# Patient Record
Sex: MALE | Race: WHITE
[De-identification: names, ages, dates, MRNs, and addresses within clinical notes are randomized per-mention and may not be internally consistent; named-entity substitution may affect disease eponyms.]

---

## 2019-11-12 ENCOUNTER — HOSPITAL ENCOUNTER (OUTPATIENT)
Dept: HOSPITAL 47 - RADMRIMAIN | Age: 44
Discharge: HOME | End: 2019-11-12
Attending: INTERNAL MEDICINE
Payer: COMMERCIAL

## 2019-11-12 DIAGNOSIS — G43.909: Primary | ICD-10-CM

## 2019-11-12 DIAGNOSIS — I63.9: ICD-10-CM

## 2019-11-12 DIAGNOSIS — R90.89: ICD-10-CM

## 2019-11-12 PROCEDURE — 70553 MRI BRAIN STEM W/O & W/DYE: CPT

## 2019-11-12 NOTE — MR
EXAMINATION TYPE: MR brain and iac wo/w con

 

DATE OF EXAM: 11/12/2019

 

COMPARISON: NONE

 

HISTORY: Temporal headaches

 

TECHNIQUE: 

Multiplanar, multisequence images of the brain and brainstem is performed without and with IV contras
t, utilizing 13 mL intravenous Gadavist .

 

FINDINGS: Diffusion weighted images demonstrate no evidence of a recent infarct or other diffusion ab
normality. There is an old lacunar injury of the caudate head.  There is no extra-axial fluid collect
ion. There are few scattered T2/FLAIR hyperintense foci in the subcortical and periventricular white 
matter, predominantly in the frontal lobes. These measure up to 4 mm. The ventricular system and cist
ernal spaces are normal in size and appearance.  The brain volume is age appropriate.

 

Midline structures demonstrate normal morphology.  The craniocervical junction appears within normal 
limits.  Post contrast images demonstrate no abnormal enhancement. The dural venous sinuses appear pa
tent. Scant mucosal thickening is seen in the ethmoid sinuses. The remaining visualized sinuses are c
lear and the globes are intact. There is tortuosity of the cavernous portions of the internal carotid
 atherosclerosis is also seen within the cavernous segments.

 

No abnormal enhancement of 7th or 8th cranial nerves. No evidence of acoustic schwannoma. No cerebell
ar pontine angle mass.

 

IMPRESSION: 

1. Mild burden nonspecific white matter change for age given the frontal lobe predominant distributio
n sequela of chronic migraines is a primary consideration. Chronic microangiopathy is another possibi
lity. Other etiologies are possible but less likely. Old lacunar injury is also seen of the caudate n
ucleus on the right.

2. No acute infarct, midline shift or mass effect. No abnormal intracranial enhancement. No MR eviden
ce of acoustic schwannoma or abnormal seventh/8th cranial nerve enhancement.

3. Minimal mucosal thickening in the ethmoid sinuses.

## 2020-03-20 ENCOUNTER — HOSPITAL ENCOUNTER (OUTPATIENT)
Dept: HOSPITAL 47 - RADCTMAIN | Age: 45
Discharge: HOME | End: 2020-03-20
Attending: INTERNAL MEDICINE
Payer: COMMERCIAL

## 2020-03-20 DIAGNOSIS — N28.89: Primary | ICD-10-CM

## 2020-03-20 PROCEDURE — 74170 CT ABD WO CNTRST FLWD CNTRST: CPT

## 2020-03-20 NOTE — CT
EXAMINATION TYPE: CT abdomen wo/w con

 

DATE OF EXAM: 3/20/2020

 

COMPARISON: None.

 

HISTORY: Pt states renal mass Rt side found during MRI. Not c/o any sx.

 

CT DLP: 3228.9 mGycm, Automated Exposure Control for Dose Reduction was Utilized.

 

CONTRAST: 

CT scan of the abdomen is performed with oral and with IV Contrast, patient injected with 100 mL of I
sovue 300.

 

FINDINGS:

 

LUNG BASES:  No significant abnormality is appreciated.

 

LIVER/GB: Noncontrast images show liver hypodense consistent with diffuse fatty infiltration.

 

PANCREAS:  No significant abnormality is seen.

 

SPLEEN:  No significant abnormality is seen.

 

ADRENALS:  No significant abnormality is seen.

 

KIDNEYS: Noncontrast images show no renal calculi bilaterally. There is abnormal anterior axis or til
orly positioning to the right kidney. Postcontrast images show symmetric cortical medullary uptake and
 excretion without hydronephrosis seen bilaterally. No concerning solid or cystic renal masses presen
t in either kidney. Suspect abnormal medial positioning the lower pole of the right kidney and axis m
ay have mimicked lesion on outside lumbar spine MRI

 

BOWEL: Oral contrast only reaches jejunal loops in the left abdomen. No suspicious small or large bow
el dilatation.

 

LYMPH NODES:  No greater than 1cm abdominal lymph nodes are appreciated.

 

OSSEOUS STRUCTURES:  No significant abnormality is seen.

 

OTHER:  No significant additional abnormality is seen.

 

IMPRESSION:  No suspicious renal solid or cystic masses. Abnormal positioning and axis of right kidne
y is suspected to have minimal mass on outside lumbar spine MRI, correlate clinically.

## 2023-06-25 ENCOUNTER — HOSPITAL ENCOUNTER (EMERGENCY)
Dept: HOSPITAL 47 - EC | Age: 48
LOS: 1 days | Discharge: HOME | End: 2023-06-26
Payer: COMMERCIAL

## 2023-06-25 VITALS — RESPIRATION RATE: 18 BRPM

## 2023-06-25 DIAGNOSIS — S81.801A: Primary | ICD-10-CM

## 2023-06-25 DIAGNOSIS — J45.909: ICD-10-CM

## 2023-06-25 DIAGNOSIS — Z86.59: ICD-10-CM
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
## 2023-06-25 LAB
ALBUMIN SERPL-MCNC: 4.8 G/DL (ref 3.5–5)
ALP SERPL-CCNC: 116 U/L (ref 38–126)
ALT SERPL-CCNC: 36 U/L (ref 4–49)
ANION GAP SERPL CALC-SCNC: 10 MMOL/L
AST SERPL-CCNC: 32 U/L (ref 17–59)
BASOPHILS # BLD AUTO: 0 K/UL (ref 0–0.2)
BASOPHILS NFR BLD AUTO: 1 %
BUN SERPL-SCNC: 20 MG/DL (ref 9–20)
CALCIUM SPEC-MCNC: 9.7 MG/DL (ref 8.4–10.2)
CHLORIDE SERPL-SCNC: 103 MMOL/L (ref 98–107)
CO2 SERPL-SCNC: 24 MMOL/L (ref 22–30)
EOSINOPHIL # BLD AUTO: 0.1 K/UL (ref 0–0.7)
EOSINOPHIL NFR BLD AUTO: 2 %
ERYTHROCYTE [DISTWIDTH] IN BLOOD BY AUTOMATED COUNT: 5.18 M/UL (ref 4.3–5.9)
ERYTHROCYTE [DISTWIDTH] IN BLOOD: 13.6 % (ref 11.5–15.5)
GLUCOSE SERPL-MCNC: 175 MG/DL (ref 74–99)
GLUCOSE UR QL: (no result)
HCT VFR BLD AUTO: 45.6 % (ref 39–53)
HGB BLD-MCNC: 15.3 GM/DL (ref 13–17.5)
LYMPHOCYTES # SPEC AUTO: 2.3 K/UL (ref 1–4.8)
LYMPHOCYTES NFR SPEC AUTO: 42 %
MCH RBC QN AUTO: 29.6 PG (ref 25–35)
MCHC RBC AUTO-ENTMCNC: 33.6 G/DL (ref 31–37)
MCV RBC AUTO: 88 FL (ref 80–100)
MONOCYTES # BLD AUTO: 0.3 K/UL (ref 0–1)
MONOCYTES NFR BLD AUTO: 5 %
NEUTROPHILS # BLD AUTO: 2.8 K/UL (ref 1.3–7.7)
NEUTROPHILS NFR BLD AUTO: 50 %
PH UR: 5.5 [PH] (ref 5–8)
PLATELET # BLD AUTO: 227 K/UL (ref 150–450)
POTASSIUM SERPL-SCNC: 4 MMOL/L (ref 3.5–5.1)
PROT SERPL-MCNC: 7.7 G/DL (ref 6.3–8.2)
SODIUM SERPL-SCNC: 137 MMOL/L (ref 137–145)
SP GR UR: 1.04 (ref 1–1.03)
UROBILINOGEN UR QL STRIP: <2 MG/DL (ref ?–2)
WBC # BLD AUTO: 5.6 K/UL (ref 3.8–10.6)

## 2023-06-25 PROCEDURE — 85025 COMPLETE CBC W/AUTO DIFF WBC: CPT

## 2023-06-25 PROCEDURE — 81003 URINALYSIS AUTO W/O SCOPE: CPT

## 2023-06-25 PROCEDURE — 99283 EMERGENCY DEPT VISIT LOW MDM: CPT

## 2023-06-25 PROCEDURE — 86140 C-REACTIVE PROTEIN: CPT

## 2023-06-25 PROCEDURE — 80053 COMPREHEN METABOLIC PANEL: CPT

## 2023-06-25 PROCEDURE — 36415 COLL VENOUS BLD VENIPUNCTURE: CPT

## 2023-06-25 NOTE — ED
General Adult HPI





- General


Chief complaint: Wound/Laceration


Stated complaint: R Leg Injury, Bleeding


Time Seen by Provider: 06/25/23 20:08


Source: patient


Mode of arrival: ambulatory





- History of Present Illness


Initial comments: 


48-year-old male with a past medical history significant for type 2 diabetes 

presents to the ED with a chief complaint of right leg wound.  Patient states 

approximately one month ago was in an accident and developed hematoma to his 

right lower leg.  Since then has been receiving regular wound care by his PCP.  

Patient states today hematoma started to drain around his dressing and drenched 

the surrounding dressings.  States that due to the accident has had numbness 

over this area and reports no increased pain of this.  States that prior culture

showed fungal infection which he recently completed antibiotics for.  Denies 

fever.  Denies chest pain or shortness of breath.  No other complaints.








- Related Data


                                Home Medications











 Medication  Instructions  Recorded  Confirmed


 


No Known Home Medications  10/20/16 10/20/16











                                    Allergies











Allergy/AdvReac Type Severity Reaction Status Date / Time


 


No Known Allergies Allergy   Verified 10/20/16 00:52














Review of Systems


ROS Statement: 


Those systems with pertinent positive or pertinent negative responses have been 

documented in the HPI.





ROS Other: All systems not noted in ROS Statement are negative.





Past Medical History


Past Medical History: Asthma, GERD/Reflux


History of Any Multi-Drug Resistant Organisms: None Reported


Past Surgical History: Appendectomy, Orthopedic Surgery


Additional Past Surgical History / Comment(s): Lt Knee Scope, LT TRICEPT TENDON 

REPAIR


Past Anesthesia/Blood Transfusion Reactions: No Reported Reaction


Additional Past Anesthesia/Blood Transfusion Reaction / Comment(s): SEEMS TO 

TAKE A BIT LONGER FOR AA TO WEAR OFF


Past Psychological History: Depression


Smoking Status: Never smoker


Past Alcohol Use History: Occasional


Past Drug Use History: None Reported





- Past Family History


  ** Mother


Family Medical History: Asthma





  ** Father


History Unknown: Yes





General Exam


Limitations: no limitations


General appearance: alert, in no apparent distress


Head exam: Present: atraumatic, normocephalic


ENT exam: Present: normal exam


Respiratory exam: Present: normal lung sounds bilaterally


Cardiovascular Exam: Present: regular rate, normal rhythm


GI/Abdominal exam: Present: soft


Extremities exam: Present: other (extravasation of blood around clear dressing 

of right lower extremity approximately 10x5 cm. upon removal of dressing, 

approximately 10 x 3 cm eschar.  Area irrigated and surrounding area showed no 

surrounding warmth, erythema, or tenderness palpation.  DP/PT pulses 2+.)


Neurological exam: Present: alert, oriented X3


Skin exam: Present: warm, dry





Course


                                   Vital Signs











  06/25/23 06/26/23





  19:23 00:05


 


Temperature 98.4 F 98.5 F


 


Pulse Rate 101 H 82


 


Respiratory 18 18





Rate  


 


Blood Pressure 147/96 129/77


 


O2 Sat by Pulse 97 97





Oximetry  














Procedures





- Procedures


Initial comment: 


Approximately 10 x 3 eschar on the right lower leg anterior-medial to the tibia.

 Eschar was covered with 2% viscous lidocaine.  Eschar was debrided at bedside. 

Patient tolerated this well with no complications.  After removal of eschar, 

evidence of large hematoma extending to the muscular tissue visualized, however 

no purulent drainage.  Eschar had no surrounding warmth or erythema or 

tenderness to palpation.  At this time opted no culture.  Patient notes recently

completed antibiotics/antifungals.  Additionally CBC showed no white count.  

Wound was packed with sterile gauze and wet to dry mannner. 








Medical Decision Making





- Medical Decision Making


Was pt. sent in by a medical professional or institution ([facility], NP, urgent 

care, hospital, or nursing home...) When possible be specific


@  -No


Did you speak to anyone other than the patient for history (EMS, parent, family,

police, friend...)? What history was obtained from this source 


@  -No


Did you review nursing and triage notes (agree or disagree)?  Why? 


@  -I reviewed and agree with nursing and triage notes


Were old charts reviewed (outside hosp., previous admission, EMS record, old 

EKG, old radiological studies, urgent care reports/EKG's, nursing home records)?

Report findings 


@  -No old charts were reviewed


Differential Diagnosis (chest pain, altered mental status, abdominal pain women,

abdominal pain men, vaginal bleeding, weakness, fever, dyspnea, syncope, 

headache, dizziness, GI bleed, back pain, seizure, CVA, palpatations, mental 

health, musculoskeletal)? 


@  -Cellulitis, MRSA, osteomyelitis.  This is not meant to be an all-inclusive 

list


EKG interpreted by me (3pts min.).


@  -None


X-rays interpreted by me (1pt min.).


@  -None done


CT interpreted by me (1pt min.).


@  -None done


U/S interpreted by me (1pt. min.).


@  -None done


What testing was considered but not performed or refused? (CT, X-rays, U/S, 

labs)? Why?


@  -None


What meds were considered but not given or refused? Why?


@  -None


Did you discuss the management of the patient with other professionals 

(professionals i.e. , PA, NP, lab, RT, psych nurse, , , 

teacher, , )? Give summary


@  -Spoke to Dr. Barron who advised admission inappropriate and advised 

outpatient care.


Was smoking cessation discussed for >3mins.?


@  -No


Was critical care preformed (if so, how long)?


@  -No


Were there social determinants of health that impacted care today? How? 

(Homelessness, low income, unemployed, alcoholism, drug addiction, 

transportation, low edu. Level, literacy, decrease access to med. care, prison, 

rehab)?


@  -No


Was there de-escalation of care discussed even if they declined (Discuss DNR or 

withdrawal of care, Hospice)? DNR status


@  -No


What co-morbidities impacted this encounter? (DM, HTN, Smoking, COPD, CAD, 

Cancer, CVA, ARF, Chemo, Hep., AIDS, mental health diagnosis, sleep apnea, 

morbid obesity)?


@  -Type 2 diabetes


Was patient admitted / discharged? Hospital course, mention meds given and 

route, prescriptions, significant lab abnormalities, going to OR and other 

pertinent info.


@  -Laboratory studies unremarkable.  Smoke to Dr. Barron was advised 

outpatient care.  Upon evaluation of the wound appears approximately 10 x 3 cm 

eschar with no active bleeding.  Spoke to Dr. Campos who advised eschar removal. 

This was done at bedside.  Patient tolerated well with no complications please 

see procedure note further details.  No evidence of infection following eschar 

removal.  However, evidence of large hematoma. Discharged home with referral to 

wound care.  Discussed return precautions the patient verbalizes agreement.


Undiagnosed new problem with uncertain prognosis?


@  -No


Drug Therapy requiring intensive monitoring for toxicity (Heparin, Nitro, 

Insulin, Cardizem)?


@  -No


Were any procedures done?


@  -Yes, please see procedure note further details.


Diagnosis/symptom?


@  -Chronic wound to right lower extremity


Acute, or Chronic, or Acute on Chronic?


@  -Acute on chronic


Uncomplicated (without systemic symptoms) or Complicated (systemic symptoms)?


@  -Uncomplicated


Side effects of treatment?


@  -No


Exacerbation, Progression, or Severe Exacerbation?


@  -No


Poses a threat to life or bodily function? How? (Chest pain, USA, MI, pneumonia,

PE, COPD, DKA, ARF, appy, cholecystitis, CVA, Diverticulitis, Homicidal, 

Suicidal, threat to staff... and all critical care pts)


@  -No








- Lab Data


Result diagrams: 


                                 06/25/23 21:27





                                 06/25/23 21:27


                                   Lab Results











  06/25/23 06/25/23 06/25/23 Range/Units





  21:11 21:27 21:27 


 


WBC   5.6   (3.8-10.6)  k/uL


 


RBC   5.18   (4.30-5.90)  m/uL


 


Hgb   15.3   (13.0-17.5)  gm/dL


 


Hct   45.6   (39.0-53.0)  %


 


MCV   88.0   (80.0-100.0)  fL


 


MCH   29.6   (25.0-35.0)  pg


 


MCHC   33.6   (31.0-37.0)  g/dL


 


RDW   13.6   (11.5-15.5)  %


 


Plt Count   227   (150-450)  k/uL


 


MPV   7.2   


 


Neutrophils %   50   %


 


Lymphocytes %   42   %


 


Monocytes %   5   %


 


Eosinophils %   2   %


 


Basophils %   1   %


 


Neutrophils #   2.8   (1.3-7.7)  k/uL


 


Lymphocytes #   2.3   (1.0-4.8)  k/uL


 


Monocytes #   0.3   (0-1.0)  k/uL


 


Eosinophils #   0.1   (0-0.7)  k/uL


 


Basophils #   0.0   (0-0.2)  k/uL


 


Sodium    137  (137-145)  mmol/L


 


Potassium    4.0  (3.5-5.1)  mmol/L


 


Chloride    103  ()  mmol/L


 


Carbon Dioxide    24  (22-30)  mmol/L


 


Anion Gap    10  mmol/L


 


BUN    20  (9-20)  mg/dL


 


Creatinine    1.02  (0.66-1.25)  mg/dL


 


Est GFR (CKD-EPI)AfAm    >90  (>60 ml/min/1.73 sqM)  


 


Est GFR (CKD-EPI)NonAf    87  (>60 ml/min/1.73 sqM)  


 


Glucose    175 H  (74-99)  mg/dL


 


Calcium    9.7  (8.4-10.2)  mg/dL


 


Total Bilirubin    0.4  (0.2-1.3)  mg/dL


 


AST    32  (17-59)  U/L


 


ALT    36  (4-49)  U/L


 


Alkaline Phosphatase    116  ()  U/L


 


C-Reactive Protein    0.8  (<1.0)  mg/dL


 


Total Protein    7.7  (6.3-8.2)  g/dL


 


Albumin    4.8  (3.5-5.0)  g/dL


 


Urine Color  Light Yellow    


 


Urine Appearance  Clear    (Clear)  


 


Urine pH  5.5    (5.0-8.0)  


 


Ur Specific Gravity  1.043 H    (1.001-1.035)  


 


Urine Protein  Negative    (Negative)  


 


Urine Glucose (UA)  4+ H    (Negative)  


 


Urine Ketones  Trace H    (Negative)  


 


Urine Blood  Negative    (Negative)  


 


Urine Nitrite  Negative    (Negative)  


 


Urine Bilirubin  Negative    (Negative)  


 


Urine Urobilinogen  <2.0    (<2.0)  mg/dL


 


Ur Leukocyte Esterase  Negative    (Negative)  














Disposition


Clinical Impression: 


 Chronic wound of extremity





Disposition: HOME SELF-CARE


Condition: Good


Instructions (If sedation given, give patient instructions):  Chronic Wound Care

(ED)


Additional Instructions: 


Please return to the Emergency Department if symptoms worsen or any other 

concerns.


Is patient prescribed a controlled substance at d/c from ED?: No


Referrals: 


Nghia Simmons MD [Primary Care Provider] - 1-2 days


[facility] [NON-STAFF] - 1-2 days


Time of Disposition: 23:57

## 2023-06-26 VITALS — DIASTOLIC BLOOD PRESSURE: 77 MMHG | TEMPERATURE: 98.5 F | SYSTOLIC BLOOD PRESSURE: 129 MMHG | HEART RATE: 82 BPM
